# Patient Record
Sex: FEMALE | Race: WHITE | NOT HISPANIC OR LATINO | URBAN - METROPOLITAN AREA
[De-identification: names, ages, dates, MRNs, and addresses within clinical notes are randomized per-mention and may not be internally consistent; named-entity substitution may affect disease eponyms.]

---

## 2017-03-23 ENCOUNTER — IMPORTED ENCOUNTER (OUTPATIENT)
Dept: URBAN - METROPOLITAN AREA CLINIC 31 | Facility: CLINIC | Age: 67
End: 2017-03-23

## 2017-03-23 PROBLEM — H18.59: Noted: 2017-03-23

## 2017-03-23 PROBLEM — H04.123: Noted: 2017-03-23

## 2017-03-23 PROCEDURE — 92015 DETERMINE REFRACTIVE STATE: CPT

## 2017-03-23 PROCEDURE — 92004 COMPRE OPH EXAM NEW PT 1/>: CPT

## 2017-03-23 NOTE — PATIENT DISCUSSION
1.  Reviewed ABMD and risk of corneal erosions and if central visual distortion. Systane gel HS and gtt suring the day. 2. Dry Eye OU:  Continue current management with Artificial Tears. 3.  Change glasses. 4.  Return for an appointment in 1 year for comprehensive exam. with Dr. Jose Roberto Villarreal.

## 2017-09-01 ENCOUNTER — APPOINTMENT (RX ONLY)
Dept: URBAN - METROPOLITAN AREA CLINIC 116 | Facility: CLINIC | Age: 67
Setting detail: DERMATOLOGY
End: 2017-09-01

## 2017-09-01 DIAGNOSIS — Z85.828 PERSONAL HISTORY OF OTHER MALIGNANT NEOPLASM OF SKIN: ICD-10-CM

## 2017-09-01 DIAGNOSIS — L82.0 INFLAMED SEBORRHEIC KERATOSIS: ICD-10-CM

## 2017-09-01 DIAGNOSIS — D485 NEOPLASM OF UNCERTAIN BEHAVIOR OF SKIN: ICD-10-CM

## 2017-09-01 PROBLEM — D23.5 OTHER BENIGN NEOPLASM OF SKIN OF TRUNK: Status: ACTIVE | Noted: 2017-09-01

## 2017-09-01 PROBLEM — D48.5 NEOPLASM OF UNCERTAIN BEHAVIOR OF SKIN: Status: ACTIVE | Noted: 2017-09-01

## 2017-09-01 PROCEDURE — 17110 DESTRUCTION B9 LES UP TO 14: CPT

## 2017-09-01 PROCEDURE — ? BIOPSY BY SHAVE METHOD

## 2017-09-01 PROCEDURE — 11100: CPT | Mod: 59

## 2017-09-01 PROCEDURE — ? LIQUID NITROGEN

## 2017-09-01 PROCEDURE — ? OBSERVATION

## 2017-09-01 PROCEDURE — 99214 OFFICE O/P EST MOD 30 MIN: CPT | Mod: 25

## 2017-09-01 PROCEDURE — 11101: CPT

## 2017-09-01 PROCEDURE — ? COUNSELING

## 2017-09-01 ASSESSMENT — LOCATION SIMPLE DESCRIPTION DERM
LOCATION SIMPLE: RIGHT SHOULDER
LOCATION SIMPLE: RIGHT BREAST
LOCATION SIMPLE: RIGHT UPPER BACK
LOCATION SIMPLE: CHEST

## 2017-09-01 ASSESSMENT — LOCATION ZONE DERM
LOCATION ZONE: ARM
LOCATION ZONE: TRUNK

## 2017-09-01 ASSESSMENT — LOCATION DETAILED DESCRIPTION DERM
LOCATION DETAILED: RIGHT MEDIAL UPPER BACK
LOCATION DETAILED: LEFT MEDIAL SUPERIOR CHEST
LOCATION DETAILED: RIGHT MEDIAL BREAST 2-3:00 REGION
LOCATION DETAILED: RIGHT POSTERIOR SHOULDER

## 2017-09-01 NOTE — PROCEDURE: BIOPSY BY SHAVE METHOD
Silver Nitrate Text: The wound bed was treated with silver nitrate after the biopsy was performed.
Destruction After The Procedure: No
X Size Of Lesion In Cm: 0
Anesthesia Type: 1% lidocaine with epinephrine
Billing Type: [employer]
Anesthesia Volume In Cc (Will Not Render If 0): 0.5
Lab Facility: [id number]
Lab: [facility]
Detail Level: Detailed
Dressing: bandage
Wound Care: Aquaphor
Electrodesiccation Text: The wound bed was treated with electrodesiccation after the biopsy was performed.
Electrodesiccation And Curettage Text: The wound bed was treated with electrodesiccation and curettage after the biopsy was performed.
Size Of Lesion In Cm: 0.3
Cryotherapy Text: The wound bed was treated with cryotherapy after the biopsy was performed.
Post-Care Instructions: I reviewed with the patient in detail post-care instructions. Patient is to keep the biopsy site dry overnight, and then apply bacitracin twice daily until healed. Patient may apply hydrogen peroxide soaks to remove any crusting.
Consent: Written consent was obtained and risks were reviewed including but not limited to scarring, infection, bleeding, scabbing, incomplete removal, nerve damage and allergy to anesthesia.
Biopsy Method: Dermablade
Type Of Destruction Used: Curettage
Path Notes (To The Dermatopathologist): 3mm
Body Location Override (Optional - Billing Will Still Be Based On Selected Body Map Location If Applicable): right deltoid
Notification Instructions: Patient will be notified of biopsy results. However, patient instructed to call the office if not contacted within 2 weeks.
Hemostasis: Drysol
Biopsy Type: H and E
Billing Type: Third-Party Bill
Size Of Lesion In Cm: 0.4
Path Notes (To The Dermatopathologist): 4mm
Body Location Override (Optional - Billing Will Still Be Based On Selected Body Map Location If Applicable): right breast
Lab: [id number]
Lab Facility: [id number]
Body Location Override (Optional - Billing Will Still Be Based On Selected Body Map Location If Applicable): mid back
Path Notes (To The Dermatopathologist): 5mm

## 2017-09-01 NOTE — PROCEDURE: LIQUID NITROGEN
Render Post-Care Instructions In Note?: yes
Detail Level: Simple
Consent: The patient's consent was obtained including but not limited to risks of crusting, scabbing, blistering, scarring, darker or lighter pigmentary change, recurrence, incomplete removal and infection.
Number Of Freeze-Thaw Cycles: 2 freeze-thaw cycles
Include Z78.9 (Other Specified Conditions Influencing Health Status) As An Associated Diagnosis?: No
Post-Care Instructions: I reviewed with the patient in detail post-care instructions. Patient is to wear sunprotection, and avoid picking at any of the treated lesions. Pt may apply Vaseline to crusted or scabbing areas.
Medical Necessity Information: It is in your best interest to select a reason for this procedure from the list below. All of these items fulfill various CMS LCD requirements except the new and changing color options.
Medical Necessity Clause: This procedure was medically necessary because the lesions that were treated were:

## 2017-09-01 NOTE — PROCEDURE: MIPS QUALITY
Quality 226: Preventive Care And Screening: Tobacco Use: Screening And Cessation Intervention: Patient screened for tobacco and never smoked
Quality 110: Preventive Care And Screening: Influenza Immunization: Influenza Immunization Administered during Influenza season
Quality 431: Preventive Care And Screening: Unhealthy Alcohol Use - Screening: Patient screened for unhealthy alcohol use using a single question and scores less than 2 times per year
Quality 111:Pneumonia Vaccination Status For Older Adults: Pneumococcal Vaccination not Administered or Previously Received, Reason not Otherwise Specified
Detail Level: Detailed

## 2018-03-15 ENCOUNTER — IMPORTED ENCOUNTER (OUTPATIENT)
Dept: URBAN - METROPOLITAN AREA CLINIC 31 | Facility: CLINIC | Age: 68
End: 2018-03-15

## 2018-03-15 PROBLEM — H18.59: Noted: 2018-03-15

## 2018-03-15 PROBLEM — H04.123: Noted: 2018-03-15

## 2018-03-15 PROCEDURE — 92014 COMPRE OPH EXAM EST PT 1/>: CPT

## 2018-03-15 NOTE — PATIENT DISCUSSION
1.  Reviewed ABMD and risk of corneal erosions and if central visual distortion. Recommend clinical observation artificial tear lubrication during the day and Rahel 128ung qhs2. Dry Eye OU:  Continue current management with Artificial Tears. 3.  Return for an appointment in 1 year for comprehensive exam. with Dr. Ashok Friedman.

## 2018-08-31 ENCOUNTER — APPOINTMENT (RX ONLY)
Dept: URBAN - METROPOLITAN AREA CLINIC 116 | Facility: CLINIC | Age: 68
Setting detail: DERMATOLOGY
End: 2018-08-31

## 2018-08-31 DIAGNOSIS — L30.9 DERMATITIS, UNSPECIFIED: ICD-10-CM

## 2018-08-31 PROCEDURE — ? COUNSELING

## 2018-08-31 PROCEDURE — ? PRESCRIPTION

## 2018-08-31 PROCEDURE — 99213 OFFICE O/P EST LOW 20 MIN: CPT

## 2018-08-31 PROCEDURE — ? TREATMENT REGIMEN

## 2018-08-31 RX ORDER — METRONIDAZOLE 7.5 MG/G
CREAM TOPICAL
Qty: 1 | Refills: 1 | Status: ERX | COMMUNITY
Start: 2018-08-31

## 2018-08-31 RX ORDER — DOXYCYCLINE HYCLATE 100 MG/1
TABLET, COATED ORAL
Qty: 14 | Refills: 0 | Status: ERX | COMMUNITY
Start: 2018-08-31

## 2018-08-31 RX ADMIN — DOXYCYCLINE HYCLATE: 100 TABLET, COATED ORAL at 19:20

## 2018-08-31 RX ADMIN — METRONIDAZOLE: 7.5 CREAM TOPICAL at 19:19

## 2018-08-31 ASSESSMENT — LOCATION ZONE DERM: LOCATION ZONE: FACE

## 2018-08-31 ASSESSMENT — LOCATION DETAILED DESCRIPTION DERM: LOCATION DETAILED: RIGHT SUPERIOR MEDIAL BUCCAL CHEEK

## 2018-08-31 ASSESSMENT — LOCATION SIMPLE DESCRIPTION DERM: LOCATION SIMPLE: RIGHT CHEEK

## 2018-08-31 NOTE — PROCEDURE: TREATMENT REGIMEN
Detail Level: Detailed
Initiate Treatment: Sensitive skin care regimen \\nTake doxycycline 100mg once a day for 2 weeks\\nApply metronidazole cream to the affected area twice daily

## 2019-08-12 ENCOUNTER — IMPORTED ENCOUNTER (OUTPATIENT)
Dept: URBAN - METROPOLITAN AREA CLINIC 31 | Facility: CLINIC | Age: 69
End: 2019-08-12

## 2019-08-12 PROBLEM — H18.59: Noted: 2019-08-12

## 2019-08-12 PROBLEM — H25.813: Noted: 2019-08-12

## 2019-08-12 PROBLEM — H16.223: Noted: 2019-08-12

## 2019-08-12 PROCEDURE — 92014 COMPRE OPH EXAM EST PT 1/>: CPT

## 2019-08-12 PROCEDURE — 92015 DETERMINE REFRACTIVE STATE: CPT

## 2019-08-12 NOTE — PATIENT DISCUSSION
1. Keratoconjunctivitis Sicca OU:  severe. Start Systane Complete at least QID and Systane gel HS OU. May need plugs. 2.  Combined Types of Cataract OU: Explained how cataracts can effect vision. Recommend clinical observation. The patient was advised to contact us if any change or worsening of vision. 3. Reviewed ABMD and risk of corneal erosions and if central visual distortion. Monitor. 4. Refractive error - Glasses change optional. 5.  Return for an appointment in 2 weeks for office call. with Dr. Bonnie Torre.

## 2019-08-12 NOTE — PATIENT DISCUSSION
Reviewed ABMD and risk of corneal erosions and if central visual distortion.   Recommend clinical observation artificial tear lubrication during the day and Rahel 128ung qhs

## 2019-08-26 ENCOUNTER — IMPORTED ENCOUNTER (OUTPATIENT)
Dept: URBAN - METROPOLITAN AREA CLINIC 31 | Facility: CLINIC | Age: 69
End: 2019-08-26

## 2019-08-26 PROBLEM — H16.223: Noted: 2019-08-26

## 2019-08-26 PROBLEM — H18.59: Noted: 2019-08-26

## 2019-08-26 PROBLEM — H25.813: Noted: 2019-08-26

## 2019-08-26 PROCEDURE — 99213 OFFICE O/P EST LOW 20 MIN: CPT

## 2019-08-26 NOTE — PATIENT DISCUSSION
1. Keratoconjunctivitis Sicca OU:  Significant improvement in symptoms. Continue Lotemax gel QAM X 2W and Systane Complete at least BID. 2. Combined Types of Cataract OU: Consult if no improvement then consult. 3. Reviewed ABMD and risk of corneal erosions and if central visual distortion - Rahel 128ung qhs4. Return for an appointment in 2 weeks for office call with Dr. Ashok Friedman.

## 2019-09-10 ENCOUNTER — IMPORTED ENCOUNTER (OUTPATIENT)
Dept: URBAN - METROPOLITAN AREA CLINIC 31 | Facility: CLINIC | Age: 69
End: 2019-09-10

## 2019-09-10 PROBLEM — H18.59: Noted: 2019-09-10

## 2019-09-10 PROBLEM — H04.123: Noted: 2019-09-10

## 2019-09-10 PROBLEM — H16.223: Noted: 2019-09-10

## 2019-09-10 PROBLEM — H25.813: Noted: 2019-09-10

## 2019-09-10 PROCEDURE — 99213 OFFICE O/P EST LOW 20 MIN: CPT

## 2020-11-30 ENCOUNTER — IMPORTED ENCOUNTER (OUTPATIENT)
Dept: URBAN - METROPOLITAN AREA CLINIC 31 | Facility: CLINIC | Age: 70
End: 2020-11-30

## 2020-11-30 PROBLEM — H25.13: Noted: 2020-11-30

## 2020-11-30 PROBLEM — H16.223: Noted: 2020-11-30

## 2020-11-30 PROCEDURE — 92015 DETERMINE REFRACTIVE STATE: CPT

## 2020-11-30 PROCEDURE — 92014 COMPRE OPH EXAM EST PT 1/>: CPT

## 2020-11-30 NOTE — PATIENT DISCUSSION
1.  Nuclear Sclerotic Cataract OU: Explained how cataracts can effect vision. Recommend clinical observation. The patient was advised to contact us if any change or worsening of vision. 2. Keratoconjunctivitis Sicca OU:  Continue current management. Symptoms and VA improved since cessation of chemo tx. 3.  Refractive error - Change glasses. 4.  Return for an appointment in 1 year for comprehensive exam with Dr. Aure Aguirre.

## 2021-01-01 NOTE — PATIENT DISCUSSION
1.  Cataract consult with FP2. Dry Eye OU:  Continue current management with Artificial Tears. Tx does not seem to help constant blur. 3. ABMD - continue Rahel 128ung qhs4. Keratoconjunctivitis Sicca OU:  Continue Systane Complete at least BID. 5. Reviewed ABMD and risk of corneal erosions and if central visual distortion - Continue Rahel 128ung qhs. Understands may be a part of the blurred VA5. Return for an appointment in 1 month for cataract evaluation. with Dr. Alex Osuna.
1. Keratoconjunctivitis Sicca OU:  Significant improvement in symptoms. Continue Systane Complete at least BID. 2. Combined Types of Cataract OU: Consult FP3.   Reviewed ABMD and risk of corneal erosions and if central visual distortion - Continue Rahel 128ung qhs
Dry Eye OU:  Continue current management with Artificial Tears.
Return for an appointment in 1 month for cataract evaluation. with Dr. Parish Johnson.
Return for an appointment in 1 month for cataract evaluation. with Dr. Tamera Calhoun.
Reviewed ABMD and risk of corneal erosions and if central visual distortion.   Recommend clinical observation artificial tear lubrication during the day and Rahel 128ung qhs
Flori Rust  (RN)  2021 13:42:24

## 2021-11-30 ENCOUNTER — IMPORTED ENCOUNTER (OUTPATIENT)
Dept: URBAN - METROPOLITAN AREA CLINIC 31 | Facility: CLINIC | Age: 71
End: 2021-11-30

## 2021-11-30 PROBLEM — H16.223: Noted: 2021-11-30

## 2021-11-30 PROBLEM — H25.13: Noted: 2021-11-30

## 2021-11-30 PROCEDURE — 92014 COMPRE OPH EXAM EST PT 1/>: CPT

## 2021-11-30 NOTE — PATIENT DISCUSSION
1.  Nuclear Sclerotic Cataract OU: Monitor. 2. Keratoconjunctivitis Sicca OU:  Continue current management. Symptoms and VA improved since cessation of chemo tx. 3.  Refractive error - Change glasses. 4.  Return for an appointment in 1 year for comprehensive exam with Dr. Lucas Rajput.

## 2022-04-02 ASSESSMENT — VISUAL ACUITY
OU_SC: 20/20
OD_SC: 20/25
OU_CC: 20/20
OS_SC: 20/25
OU_CC: 20/20
OD_SC: 20/40+1
OS_SC: 20/20
OD_PH: SC 20/30

## 2022-04-02 ASSESSMENT — TONOMETRY
OS_IOP_MMHG: 14
OS_IOP_MMHG: 16
OS_IOP_MMHG: 14
OD_IOP_MMHG: 14
OD_IOP_MMHG: 13
OD_IOP_MMHG: 13
OD_IOP_MMHG: 16
OS_IOP_MMHG: 14
OS_IOP_MMHG: 13
OD_IOP_MMHG: 14

## 2022-06-06 ENCOUNTER — ESTABLISHED PATIENT (OUTPATIENT)
Dept: URBAN - METROPOLITAN AREA CLINIC 29 | Facility: CLINIC | Age: 72
End: 2022-06-06

## 2022-06-06 DIAGNOSIS — H52.4: ICD-10-CM

## 2022-06-06 DIAGNOSIS — H52.03: ICD-10-CM

## 2022-06-06 DIAGNOSIS — H25.13: ICD-10-CM

## 2022-06-06 PROCEDURE — 99214 OFFICE O/P EST MOD 30 MIN: CPT

## 2022-06-06 PROCEDURE — 92015 DETERMINE REFRACTIVE STATE: CPT

## 2022-06-06 ASSESSMENT — VISUAL ACUITY
OS_CC: J1
OD_CC: 20/50
OD_CC: J1
OS_CC: 20/25

## 2022-06-06 NOTE — PATIENT DISCUSSION
1.  Nuclear Sclerotic Cataract OU: Monitor. 2. Keratoconjunctivitis Sicca OU:  Continue current management. Symptoms and VA improved since cessation of chemo tx. 3.  Refractive error - Change glasses. 4.  Return for an appointment in 1 year for comprehensive exam with Dr. Ashok Friedman.

## 2022-12-05 ENCOUNTER — ESTABLISHED PATIENT (OUTPATIENT)
Dept: URBAN - METROPOLITAN AREA CLINIC 29 | Facility: CLINIC | Age: 72
End: 2022-12-05

## 2022-12-05 PROCEDURE — 99214 OFFICE O/P EST MOD 30 MIN: CPT

## 2022-12-05 ASSESSMENT — VISUAL ACUITY
OS_CC: 20/25
OS_CC: 20/25-1
OD_CC: 20/30
OD_CC: 20/25

## 2022-12-05 ASSESSMENT — TONOMETRY
OD_IOP_MMHG: 13
OS_IOP_MMHG: 11

## 2022-12-05 NOTE — PATIENT DISCUSSION
--------------- APPROVED REPORT --------------





Date of service: 08/23/2018



EKG Measurement

Heart [id number]

NE [id number]

[id number]

[id number]

[id number]



<Conclusion>

Normal sinus rhythm

Normal ECG Monitor cataract for progression.

## 2023-06-07 ENCOUNTER — COMPREHENSIVE EXAM (OUTPATIENT)
Dept: URBAN - METROPOLITAN AREA CLINIC 29 | Facility: CLINIC | Age: 73
End: 2023-06-07

## 2023-06-07 DIAGNOSIS — H25.813: ICD-10-CM

## 2023-06-07 DIAGNOSIS — H16.223: ICD-10-CM

## 2023-06-07 DIAGNOSIS — H52.03: ICD-10-CM

## 2023-06-07 DIAGNOSIS — H52.4: ICD-10-CM

## 2023-06-07 PROCEDURE — 92015 DETERMINE REFRACTIVE STATE: CPT

## 2023-06-07 PROCEDURE — 92014 COMPRE OPH EXAM EST PT 1/>: CPT

## 2023-06-07 ASSESSMENT — VISUAL ACUITY
OS_CC: 20/25+1
OD_CC: 20/30+1
OS_CC: 20/20-2
OD_CC: 20/40+1
OD_PH: 20/30

## 2023-06-07 ASSESSMENT — TONOMETRY
OD_IOP_MMHG: 05
OS_IOP_MMHG: 07

## 2025-02-06 ENCOUNTER — COMPREHENSIVE EXAM (OUTPATIENT)
Age: 75
End: 2025-02-06

## 2025-02-06 DIAGNOSIS — H16.223: ICD-10-CM

## 2025-02-06 DIAGNOSIS — Z96.1: ICD-10-CM

## 2025-02-06 PROCEDURE — 99214 OFFICE O/P EST MOD 30 MIN: CPT
